# Patient Record
Sex: FEMALE | Race: WHITE | NOT HISPANIC OR LATINO | Employment: FULL TIME | URBAN - NONMETROPOLITAN AREA
[De-identification: names, ages, dates, MRNs, and addresses within clinical notes are randomized per-mention and may not be internally consistent; named-entity substitution may affect disease eponyms.]

---

## 2017-04-11 ENCOUNTER — OFFICE VISIT (OUTPATIENT)
Dept: CARDIOLOGY | Facility: CLINIC | Age: 37
End: 2017-04-11

## 2017-04-11 VITALS
BODY MASS INDEX: 17.43 KG/M2 | DIASTOLIC BLOOD PRESSURE: 76 MMHG | SYSTOLIC BLOOD PRESSURE: 129 MMHG | HEART RATE: 120 BPM | WEIGHT: 104.6 LBS | HEIGHT: 65 IN | OXYGEN SATURATION: 100 %

## 2017-04-11 DIAGNOSIS — R07.89 OTHER CHEST PAIN: Primary | ICD-10-CM

## 2017-04-11 DIAGNOSIS — R00.2 PALPITATIONS: ICD-10-CM

## 2017-04-11 DIAGNOSIS — R00.0 TACHYCARDIA: ICD-10-CM

## 2017-04-11 PROBLEM — R07.9 CHEST PAIN: Status: ACTIVE | Noted: 2017-04-11

## 2017-04-11 PROCEDURE — 99204 OFFICE O/P NEW MOD 45 MIN: CPT | Performed by: PHYSICIAN ASSISTANT

## 2017-04-11 PROCEDURE — 93000 ELECTROCARDIOGRAM COMPLETE: CPT | Performed by: PHYSICIAN ASSISTANT

## 2017-04-11 RX ORDER — BUSPIRONE HYDROCHLORIDE 15 MG/1
TABLET ORAL DAILY
Refills: 0 | COMMUNITY
Start: 2017-03-22 | End: 2017-07-18 | Stop reason: ALTCHOICE

## 2017-04-11 NOTE — PROGRESS NOTES
"Ovidio Morse is a 36 y.o. female     Chief Complaint   Patient presents with   • Establish Care   • Chest Pain   • Rapid Heart Rate   • Shortness of Breath       HPI    Patient is a 36-year-old female that presents to our office in the setting of chest pain palpitations and dyspnea.    Approximately a few weeks ago, patient states that her mother had a \"health crisis\". She describes having what she felt was a panic attack. She felt her heart racing, became diaphoretic, and had discomfort in her chest with shortness of breath which resolved later that day. However, since that initial visit she has been having episodes of chest discomfort. She has noticed more palpitations the normal and feeling her heart \"fluttering\". Interestingly, she states that her palpitations appear to have improved. Her palpitations to cause discomfort in her chest that did not cause dizziness presyncope or syncope.    She recently was evaluated by primary and had EKG performed and was referred for further evaluation.      Current Outpatient Prescriptions   Medication Sig Dispense Refill   • busPIRone (BUSPAR) 15 MG tablet Daily.  0   • SUMAtriptan Succinate (IMITREX PO) Take  by mouth. Prn migraines       No current facility-administered medications for this visit.        Review of patient's allergies indicates no known allergies.    Past Medical History:   Diagnosis Date   • Anxiety    • Arrhythmia    • Chest pain    • Migraine        Social History     Social History   • Marital status:      Spouse name: N/A   • Number of children: N/A   • Years of education: N/A     Occupational History   • Not on file.     Social History Main Topics   • Smoking status: Never Smoker   • Smokeless tobacco: Not on file   • Alcohol use No   • Drug use: No   • Sexual activity: Not on file     Other Topics Concern   • Not on file     Social History Narrative   • No narrative on file       @\[facility]\""@    Review of Systems " "  Constitutional: Positive for fatigue.   Eyes: Positive for visual disturbance (contacts and has glasses).   Respiratory: Positive for shortness of breath (with anxiety episode ).    Cardiovascular: Positive for chest pain and palpitations (occas.). Negative for leg swelling.   Gastrointestinal: Negative.    Endocrine: Negative.    Genitourinary: Negative.    Musculoskeletal: Negative.    Skin: Negative.    Allergic/Immunologic: Negative.    Neurological: Positive for headaches (HX of migraines).   Hematological: Negative.    Psychiatric/Behavioral: Negative.        Objective     /76 (BP Location: Left arm, Patient Position: Sitting)  Pulse 120  Ht 65\" (165.1 cm)  Wt 104 lb 9.6 oz (47.4 kg)  SpO2 100%  BMI 17.41 kg/m2    Lab Results (most recent)     None          Physical Exam   Constitutional: She is oriented to person, place, and time. She appears well-developed and well-nourished. No distress.   HENT:   Head: Normocephalic and atraumatic.   Eyes: EOM are normal. Pupils are equal, round, and reactive to light.   Neck: No JVD present.   Cardiovascular: Regular rhythm and normal heart sounds.  Tachycardia present.  Exam reveals no gallop and no friction rub.    No murmur heard.  Pulmonary/Chest: Effort normal and breath sounds normal. No respiratory distress. She has no wheezes. She has no rales. She exhibits no tenderness.   Musculoskeletal: Normal range of motion. She exhibits no edema.   Neurological: She is alert and oriented to person, place, and time. No cranial nerve deficit.   Skin: Skin is warm and dry. No rash noted. No erythema. No pallor.   Psychiatric: She has a normal mood and affect. Her behavior is normal.   Nursing note and vitals reviewed.      Procedure     ECG 12 Lead  Date/Time: 4/11/2017 10:54 AM  Performed by: CLARENCE CHRISTIANSEN  Authorized by: CLARENCE CHRISTIANSEN   Comments: EKG indication chest pain    EKG demonstrates sinus tachycardia 10 9 bpm, normal axis, right atrial " enlargement, no acute ST changes                 Assessment/Plan     Problems Addressed this Visit        Cardiovascular and Mediastinum    Palpitations    Relevant Orders    ECG 12 Lead    Treadmill Stress Test    Adult Transthoracic Echo Complete    Holter Monitor - 24 Hour    Tachycardia    Relevant Orders    ECG 12 Lead    Treadmill Stress Test    Adult Transthoracic Echo Complete    Holter Monitor - 24 Hour       Nervous and Auditory    Chest pain - Primary    Relevant Orders    ECG 12 Lead    Treadmill Stress Test    Adult Transthoracic Echo Complete    Holter Monitor - 24 Hour                Recommendations  1. Patient with atypical chest discomfort. We will schedule for an ischemia assessment including a treadmill stress test. I would like to get echocardiogram to evaluate systolic and diastolic function as well as chamber size based on abnormal EKG. 24-hour Holter monitor to ensure no arrhythmias. We will see her back for follow-up of above testing. She has had recent blood work performed by Dr. Dey and is to follow-up. Follow-up with primary as scheduled

## 2017-04-17 ENCOUNTER — OUTSIDE FACILITY SERVICE (OUTPATIENT)
Dept: CARDIOLOGY | Facility: CLINIC | Age: 37
End: 2017-04-17

## 2017-04-17 ENCOUNTER — HOSPITAL ENCOUNTER (OUTPATIENT)
Dept: CARDIOLOGY | Facility: HOSPITAL | Age: 37
Discharge: HOME | End: 2017-04-17

## 2017-04-17 ENCOUNTER — HOSPITAL ENCOUNTER (OUTPATIENT)
Dept: CARDIOLOGY | Facility: HOSPITAL | Age: 37
Discharge: HOME | End: 2017-04-17
Admitting: PHYSICIAN ASSISTANT

## 2017-04-17 LAB
MAXIMAL PREDICTED HEART RATE: 182 BPM
STRESS TARGET HR: 155 BPM

## 2017-04-17 PROCEDURE — 93306 TTE W/DOPPLER COMPLETE: CPT

## 2017-04-17 PROCEDURE — 93018 CV STRESS TEST I&R ONLY: CPT | Performed by: INTERNAL MEDICINE

## 2017-04-17 PROCEDURE — 93306 TTE W/DOPPLER COMPLETE: CPT | Performed by: INTERNAL MEDICINE

## 2017-04-17 PROCEDURE — 93017 CV STRESS TEST TRACING ONLY: CPT

## 2017-04-22 ENCOUNTER — OUTSIDE FACILITY SERVICE (OUTPATIENT)
Dept: CARDIOLOGY | Facility: CLINIC | Age: 37
End: 2017-04-22

## 2017-04-22 PROCEDURE — 93227 XTRNL ECG REC<48 HR R&I: CPT | Performed by: INTERNAL MEDICINE

## 2017-07-18 ENCOUNTER — OFFICE VISIT (OUTPATIENT)
Dept: CARDIOLOGY | Facility: CLINIC | Age: 37
End: 2017-07-18

## 2017-07-18 VITALS
BODY MASS INDEX: 16.6 KG/M2 | WEIGHT: 99.6 LBS | DIASTOLIC BLOOD PRESSURE: 64 MMHG | HEIGHT: 65 IN | SYSTOLIC BLOOD PRESSURE: 116 MMHG | OXYGEN SATURATION: 100 % | HEART RATE: 82 BPM

## 2017-07-18 DIAGNOSIS — R06.02 SHORTNESS OF BREATH: Primary | ICD-10-CM

## 2017-07-18 DIAGNOSIS — R00.2 PALPITATIONS: ICD-10-CM

## 2017-07-18 DIAGNOSIS — R53.82 CHRONIC FATIGUE: ICD-10-CM

## 2017-07-18 PROCEDURE — 99213 OFFICE O/P EST LOW 20 MIN: CPT | Performed by: PHYSICIAN ASSISTANT

## 2017-07-18 RX ORDER — SUMATRIPTAN 25 MG/1
TABLET, FILM COATED ORAL
COMMUNITY
Start: 2017-04-20

## 2017-07-18 RX ORDER — SERTRALINE HYDROCHLORIDE 25 MG/1
TABLET, FILM COATED ORAL DAILY
Refills: 0 | COMMUNITY
Start: 2017-07-09
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
## 2024-09-20 LAB
MAXIMAL PREDICTED HEART RATE: 182 BPM
STRESS TARGET HR: 155 BPM